# Patient Record
Sex: MALE | Race: WHITE | Employment: FULL TIME | URBAN - METROPOLITAN AREA
[De-identification: names, ages, dates, MRNs, and addresses within clinical notes are randomized per-mention and may not be internally consistent; named-entity substitution may affect disease eponyms.]

---

## 2022-03-31 ENCOUNTER — HOSPITAL ENCOUNTER (EMERGENCY)
Age: 37
Discharge: HOME | End: 2022-04-01
Attending: EMERGENCY MEDICINE

## 2022-03-31 DIAGNOSIS — R11.2 NON-INTRACTABLE VOMITING WITH NAUSEA, UNSPECIFIED VOMITING TYPE: Primary | ICD-10-CM

## 2022-03-31 PROCEDURE — 99283 EMERGENCY DEPT VISIT LOW MDM: CPT

## 2022-03-31 NOTE — Clinical Note
Annette Cooney was seen and treated in our emergency department on 3/31/2022. He may return to work on 04/01/2022. Patient may return to work immediately with no restriction     If you have any questions or concerns, please don't hesitate to call.       Juanjose Linares MD

## 2022-04-01 VITALS
RESPIRATION RATE: 16 BRPM | SYSTOLIC BLOOD PRESSURE: 124 MMHG | TEMPERATURE: 98.5 F | HEART RATE: 106 BPM | DIASTOLIC BLOOD PRESSURE: 84 MMHG | WEIGHT: 150 LBS | OXYGEN SATURATION: 95 % | BODY MASS INDEX: 22.73 KG/M2 | HEIGHT: 68 IN

## 2022-04-01 PROCEDURE — 6370000000 HC RX 637 (ALT 250 FOR IP): Performed by: STUDENT IN AN ORGANIZED HEALTH CARE EDUCATION/TRAINING PROGRAM

## 2022-04-01 RX ORDER — ONDANSETRON 4 MG/1
4 TABLET, ORALLY DISINTEGRATING ORAL ONCE
Status: COMPLETED | OUTPATIENT
Start: 2022-04-01 | End: 2022-04-01

## 2022-04-01 RX ORDER — ACETAMINOPHEN 500 MG
1000 TABLET ORAL ONCE
Status: COMPLETED | OUTPATIENT
Start: 2022-04-01 | End: 2022-04-01

## 2022-04-01 RX ADMIN — ONDANSETRON 4 MG: 4 TABLET, ORALLY DISINTEGRATING ORAL at 00:14

## 2022-04-01 RX ADMIN — ACETAMINOPHEN 1000 MG: 500 TABLET ORAL at 00:13

## 2022-04-01 ASSESSMENT — ENCOUNTER SYMPTOMS
DIARRHEA: 0
COUGH: 0
ABDOMINAL PAIN: 0
APNEA: 0
SORE THROAT: 0
VOMITING: 1
ABDOMINAL DISTENTION: 0
NAUSEA: 1
EYES NEGATIVE: 1
SHORTNESS OF BREATH: 0

## 2022-04-01 ASSESSMENT — PAIN SCALES - GENERAL: PAINLEVEL_OUTOF10: 4

## 2022-04-01 NOTE — ED NOTES
Patient presents to ER with c/o emesis x3 today  Unable to verbalize when it started other then saying \"today\"  Patient is a/o, skin warm and dry     Vivian Malik RN  04/01/22 [id number]

## 2022-04-01 NOTE — ED PROVIDER NOTES
[facility]     Emergency Department     Faculty Attestation    I performed a history and physical examination of the patient and discussed management with the resident. I reviewed the resident´s note and agree with the documented findings and plan of care. Any areas of disagreement are noted on the chart. I was personally present for the key portions of any procedures. I have documented in the chart those procedures where I was not present during the key portions. I have reviewed the emergency nurses triage note. I agree with the chief complaint, past medical history, past surgical history, allergies, medications, social and family history as documented unless otherwise noted below. For Physician Assistant/ Nurse Practitioner cases/documentation I have personally evaluated this patient and have completed at least one if not all key elements of the E/M (history, physical exam, and MDM). Additional findings are as noted. Patient thinks he ate some bad food earlier this morning and now has had 3 episodes of vomiting but he states he feels back to normal now. Denies abdominal pain, denies nausea at this time. Patient would like to go back to work without restriction.   On exam the abdomen soft and nontender, no guarding or rebounding, no pain at McBurney's point, chest clear, heart exam normal.     Aiden Aj MD  04/01/22 0021

## 2022-04-01 NOTE — ED NOTES
Discharge instructions given by Dr. Magdiel Schreiber who verbalized pt understanding. All questions answered.        Greg Ramos RN  04/01/22 [id number]

## 2022-04-01 NOTE — ED PROVIDER NOTES
[facility]  ED  Emergency Department Encounter  Emergency Medicine Resident     Pt Name: Teressa Alvarado  IJK:[id number]  [place] 1985  Date of evaluation: 4/1/22  PCP:  No primary care provider on file. CHIEF COMPLAINT       Chief Complaint   Patient presents with    Emesis     Emesis x3 today       HISTORY OF PRESENT ILLNESS  (Location/Symptom, Timing/Onset, Context/Setting, Quality, Duration, ModifyingFactors, Severity.)      Teressa Alvarado is a 30 y.o. male presents to the emergency room in for evaluation of nausea and emesis. Patient states that he was at work when he had a single bout of emesis. Stated that his work sent him here for evaluation. Patient states that he is feeling well now, states that he does have a mild headache but his nausea is nearly completely subsided. Patient states he did have some bad food yesterday that his mother did make and has been feeling unwell until he had a single bout of emesis this evening. Patient denies sick contacts, recent illnesses. States that this is not the worst headache of his life, no thunderclap onset, no changes in vision smell or hearing. Has had no balance or dizziness symptoms. States that he is been able to tolerate p.o. intake. Does state that he is an nearly every other day marijuana user. PAST MEDICAL / SURGICAL / SOCIAL /FAMILY HISTORY      has no past medical history on file. No other pertinent PMH on review with patient/guardian. has no past surgical history on file. No other pertinent PSH on review with patient/guardian.   Social History     Socioeconomic History    Marital status: Not on file     Spouse name: Not on file    Number of children: Not on file    Years of education: Not on file    Highest education level: Not on file   Occupational History    Not on file   Tobacco Use    Smoking status: Current Every Day Smoker    Smokeless tobacco: Not on file   Substance and Sexual Activity    Alcohol use: Yes    Drug use: Yes     Types: Marijuana Norval Remak)    Sexual activity: Not on file   Other Topics Concern    Not on file   Social History Narrative    Not on file     Social Determinants of Health     Financial Resource Strain:     Difficulty of Paying Living Expenses: Not on file   Food Insecurity:     Worried About Running Out of Food in the Last Year: Not on file    Yudy of Food in the Last Year: Not on file   Transportation Needs:     Lack of Transportation (Medical): Not on file    Lack of Transportation (Non-Medical): Not on file   Physical Activity:     Days of Exercise per Week: Not on file    Minutes of Exercise per Session: Not on file   Stress:     Feeling of Stress : Not on file   Social Connections:     Frequency of Communication with Friends and Family: Not on file    Frequency of Social Gatherings with Friends and Family: Not on file    Attends Denominational Services: Not on file    Active Member of [address] [place] [address] or Organizations: Not on file    Attends Club or Organization Meetings: Not on file    Marital Status: Not on file   Intimate Partner Violence:     Fear of Current or Ex-Partner: Not on file    Emotionally Abused: Not on file    Physically Abused: Not on file    Sexually Abused: Not on file   Housing Stability:     Unable to Pay for Housing in the Last Year: Not on file    Number of [place] in the Last Year: Not on file    Unstable Housing in the Last Year: Not on file       I counseled the patient against using tobacco products. History reviewed. No pertinent family history. No other pertinent FamHx on review with patient/guardian. Allergies:  Patient has no known allergies. Home Medications:  Prior to Admission medications    Not on File       REVIEW OF SYSTEMS    (2-9 systems for level 4, 10 ormore for level 5)      Review of Systems   Constitutional: Positive for appetite change. Negative for activity change, fatigue and fever.    HENT: Negative for congestion and sore throat. Eyes: Negative. Respiratory: Negative for apnea, cough and shortness of breath. Cardiovascular: Negative for chest pain. Gastrointestinal: Positive for nausea and vomiting. Negative for abdominal distention, abdominal pain and diarrhea. Genitourinary: Negative for dysuria and urgency. Musculoskeletal: Negative for arthralgias and myalgias. Skin: Negative. Neurological: Negative for dizziness and seizures. Psychiatric/Behavioral: Negative. PHYSICAL EXAM   (up to 7 for level 4, 8 or more for level 5)      INITIAL VITALS:   /84   Pulse 106   Temp 98.5 °F (36.9 °C) (Oral)   Resp 16   Ht 5' 8\" (1.727 m)   Wt 150 lb (68 kg)   SpO2 95%   BMI 22.81 kg/m²     Physical Exam  Constitutional:       Appearance: Normal appearance. HENT:      Head: Normocephalic and atraumatic. Nose: Nose normal.      Mouth/Throat:      Mouth: Mucous membranes are moist.      Pharynx: Oropharynx is clear. Eyes:      Extraocular Movements: Extraocular movements intact. Conjunctiva/sclera: Conjunctivae normal.      Pupils: Pupils are equal, round, and reactive to light. Cardiovascular:      Rate and Rhythm: Regular rhythm. Tachycardia present. Pulses: Normal pulses. Heart sounds: Normal heart sounds. Abdominal:      General: Abdomen is flat. Palpations: Abdomen is soft. Musculoskeletal:         General: Normal range of motion. Cervical back: Neck supple. Skin:     General: Skin is warm and dry. Capillary Refill: Capillary refill takes less than 2 seconds. Neurological:      General: No focal deficit present. Mental Status: He is alert. Mental status is at baseline. Psychiatric:         Mood and Affect: Mood normal.         DIFFERENTIAL  DIAGNOSIS     DDX: Gastritis, food poisoning    PLAN (LABS / IMAGING / EKG):  No orders of the defined types were placed in this encounter.       MEDICATIONS ORDERED:  Orders Placed This Encounter   Medications    ondansetron (ZOFRAN-ODT) disintegrating tablet 4 mg    acetaminophen (TYLENOL) tablet 1,000 mg           DIAGNOSTIC RESULTS / EMERGENCY DEPARTMENT COURSE / MDM     LABS:  No results found for this visit on 03/31/22. IMPRESSION/MDM/ED COURSE:  30 y.o. male presented with an episode of nausea and vomiting. Patient states that he otherwise feels all right. Will symptomatically treat with Zofran and Tylenol and give oral fluids his patient is able to tolerate p.o. intake. Will retake patient's vital signs. If heart rate below 100 will discharge. ED Course as of 04/01/22 0022 Fri Apr 01, 2022   0021 Patient feels well after receiving his medication. Will discharge home at this time. [ES]      ED Course User Index  [ES] Loren Davila MD        Patient/Guardian requesting discharge. Patient/Guardian was given written and verbal instructions prior to discharge. Patient/Guardian understood and agreed. Patient/Guardian had no further questions. RADIOLOGY:  No orders to display         EKG  None    All EKG's are interpreted by the Emergency Department Physician who either signs or Co-signs this chart in the absence of a cardiologist.      PROCEDURES:  None    CONSULTS:  None        FINAL IMPRESSION      1. Non-intractable vomiting with nausea, unspecified vomiting type          DISPOSITION / PLAN       DISPOSITION Decision To Discharge 04/01/2022 12:21:12 AM            PATIENT REFERREDTO:  [facility]  [address] [place] [address]  [phone]  Go to   As needed    Gibson Peralta MD  [address]   [address]  [id number]  [phone]    Schedule an appointment as soon as possible for a visit   Establish care as your primary care doctor      DISCHARGE MEDICATIONS:  New Prescriptions    No medications on file       Loren Davila MD  PGY 2  Resident Physician Emergency Medicine  04/01/22 12:22 AM        (Please note that portions of this note were completed with a voice recognition program.Efforts were made to edit the dictations but occasionally words are mis-transcribed.)       Lo Schmitz MD  Resident  04/01/22 [id number]